# Patient Record
Sex: MALE | Race: WHITE
[De-identification: names, ages, dates, MRNs, and addresses within clinical notes are randomized per-mention and may not be internally consistent; named-entity substitution may affect disease eponyms.]

---

## 2018-03-09 ENCOUNTER — HOSPITAL ENCOUNTER (INPATIENT)
Dept: HOSPITAL 17 - NEPB | Age: 43
LOS: 1 days | Discharge: HOME | DRG: 101 | End: 2018-03-10
Attending: HOSPITALIST | Admitting: HOSPITALIST
Payer: COMMERCIAL

## 2018-03-09 VITALS
RESPIRATION RATE: 18 BRPM | DIASTOLIC BLOOD PRESSURE: 68 MMHG | SYSTOLIC BLOOD PRESSURE: 123 MMHG | TEMPERATURE: 97 F | OXYGEN SATURATION: 96 % | HEART RATE: 63 BPM

## 2018-03-09 VITALS
SYSTOLIC BLOOD PRESSURE: 120 MMHG | DIASTOLIC BLOOD PRESSURE: 78 MMHG | OXYGEN SATURATION: 95 % | HEART RATE: 62 BPM | TEMPERATURE: 97.5 F | RESPIRATION RATE: 18 BRPM

## 2018-03-09 VITALS
OXYGEN SATURATION: 96 % | TEMPERATURE: 97.7 F | RESPIRATION RATE: 20 BRPM | DIASTOLIC BLOOD PRESSURE: 87 MMHG | SYSTOLIC BLOOD PRESSURE: 119 MMHG | HEART RATE: 66 BPM

## 2018-03-09 VITALS
RESPIRATION RATE: 18 BRPM | DIASTOLIC BLOOD PRESSURE: 77 MMHG | SYSTOLIC BLOOD PRESSURE: 127 MMHG | TEMPERATURE: 97.6 F | OXYGEN SATURATION: 94 % | HEART RATE: 64 BPM

## 2018-03-09 VITALS
HEART RATE: 67 BPM | RESPIRATION RATE: 20 BRPM | TEMPERATURE: 98.2 F | DIASTOLIC BLOOD PRESSURE: 68 MMHG | SYSTOLIC BLOOD PRESSURE: 125 MMHG | OXYGEN SATURATION: 95 %

## 2018-03-09 DIAGNOSIS — Z94.81: ICD-10-CM

## 2018-03-09 DIAGNOSIS — J45.909: ICD-10-CM

## 2018-03-09 DIAGNOSIS — C92.00: ICD-10-CM

## 2018-03-09 DIAGNOSIS — K21.9: ICD-10-CM

## 2018-03-09 DIAGNOSIS — Z86.61: ICD-10-CM

## 2018-03-09 DIAGNOSIS — G40.909: Primary | ICD-10-CM

## 2018-03-09 DIAGNOSIS — E07.9: ICD-10-CM

## 2018-03-09 PROCEDURE — 85730 THROMBOPLASTIN TIME PARTIAL: CPT

## 2018-03-09 PROCEDURE — 85610 PROTHROMBIN TIME: CPT

## 2018-03-09 PROCEDURE — 96374 THER/PROPH/DIAG INJ IV PUSH: CPT

## 2018-03-09 PROCEDURE — 85025 COMPLETE CBC W/AUTO DIFF WBC: CPT

## 2018-03-09 PROCEDURE — 80053 COMPREHEN METABOLIC PANEL: CPT

## 2018-03-09 PROCEDURE — 70450 CT HEAD/BRAIN W/O DYE: CPT

## 2018-03-09 PROCEDURE — 99283 EMERGENCY DEPT VISIT LOW MDM: CPT

## 2018-03-09 PROCEDURE — 70553 MRI BRAIN STEM W/O & W/DYE: CPT

## 2018-03-09 PROCEDURE — 80307 DRUG TEST PRSMV CHEM ANLYZR: CPT

## 2018-03-09 PROCEDURE — 84484 ASSAY OF TROPONIN QUANT: CPT

## 2018-03-09 PROCEDURE — 71045 X-RAY EXAM CHEST 1 VIEW: CPT

## 2018-03-09 PROCEDURE — 83735 ASSAY OF MAGNESIUM: CPT

## 2018-03-09 PROCEDURE — 81001 URINALYSIS AUTO W/SCOPE: CPT

## 2018-03-09 PROCEDURE — 96375 TX/PRO/DX INJ NEW DRUG ADDON: CPT

## 2018-03-09 PROCEDURE — 96361 HYDRATE IV INFUSION ADD-ON: CPT

## 2018-03-09 PROCEDURE — 85652 RBC SED RATE AUTOMATED: CPT

## 2018-03-09 PROCEDURE — 70460 CT HEAD/BRAIN W/DYE: CPT

## 2018-03-09 PROCEDURE — 82550 ASSAY OF CK (CPK): CPT

## 2018-03-09 PROCEDURE — 80048 BASIC METABOLIC PNL TOTAL CA: CPT

## 2018-03-09 PROCEDURE — A9579 GAD-BASE MR CONTRAST NOS,1ML: HCPCS

## 2018-03-09 PROCEDURE — 95819 EEG AWAKE AND ASLEEP: CPT

## 2018-03-09 RX ADMIN — LACOSAMIDE SCH MG: 100 TABLET, FILM COATED ORAL at 11:11

## 2018-03-09 RX ADMIN — LACOSAMIDE SCH MG: 100 TABLET, FILM COATED ORAL at 21:37

## 2018-03-09 RX ADMIN — PANTOPRAZOLE SCH MG: 40 TABLET, DELAYED RELEASE ORAL at 10:07

## 2018-03-09 RX ADMIN — SERTRALINE HYDROCHLORIDE SCH MG: 50 TABLET, FILM COATED ORAL at 10:07

## 2018-03-09 NOTE — MB
cc:

Tk Myers MD

****

 

 

DATE OF CONSULT:

03/09/2018

 

HISTORY OF PRESENT ILLNESS:

The patient is a 42-year-old right-handed man with a history of 

thyroid disease, although he is not on medication.  He says that his 

doctors are watching this.  He has been otherwise healthy except that 

he was diagnosed with leukemia one year ago and had bone marrow 

transplant.  He follows with Dr. Morales as outpatient.   Since the 

bone marrow transplant he has had no complications; however, 

approximately one week ago he started developing some headaches 

primarily on the left side that occurred with certain episodes such as

he would have numbness that would start on one side of his body and 

spread to the other side of his body over a period of 30 minutes.  

Prior to the episodes, he would have a metallic taste in his mouth.  

This has happened three or four times over the last four weeks.  The 

last episode was yesterday.  He was unable to talk during this 

episode.  His wife was there.  He states that this is the first time 

he was trying to communicate during the episode.  The numbness usually

starts in one hand and spreads throughout the other side of the body. 

After the episodes he can experience a little bit of confusion and 

otherwise the numbness completely subsides between episodes.

 

Per the patient's wife, he did have a seizure disorder with the prior 

history of leukemia, was taken off seizure medications after the bone 

marrow transplant and has been having some neurological symptoms for 

the last month with periods of decreased cognition and numbness.

 

PAST MEDICAL HISTORY:

1.  History of viral meningitis.

2.  Gangrenous cholecystitis.

3.  Asthma.

4.  Leukemia with bone marrow transplant.

 

REVIEW OF SYSTEMS:

He denies hypertension, diabetes, hypercholesterolemia, myocardial 

infarction, bypass and angioplasty, atrial fibrillation, Coumadin.  He

is not on any other blood thinners.  He denies renal disease, hepatic 

disease, pulmonary disease, lupus, ulcer or stroke in the past.  He 

has no major medical problems.

 

SOCIAL HISTORY:

Nonsmoker, nondrinker.  Does not do illicit drugs.  Lives with his 

wife.

 

FAMILY HISTORY:

Positive for cancer, negative for seizure or stroke.

 

HOSPITALIZATIONS:

One year ago he was admitted to [facility].  We can check the 

record over there.

 

MEDICATIONS AT HOME:

1.  Valacyclovir 500 mg daily.

2.  Sertraline 25 mg daily.

3.  Pantoprazole 40 mg daily.

 

MEDICATIONS IN THE HOSPITAL:

1.  Continued on the Protonix.

2.  Zoloft.

3.  He was given some Toradol.

4.  Benadryl.

5.  Ativan.

 

PHYSICAL EXAMINATION:

VITAL SIGNS:  Afebrile, pulse 62, respiratory rate 18, blood pressure 

120/78, pulse 95% on room air.

CARDIOVASCULAR:  Heart - regular rate and rhythm.  I do not detect a 

murmur or carotid bruit.

NEUROLOGICAL EXAMINATION:  Visual fields are full.  Extraocular 

movements are intact, without nystagmus.  Tongue is midline.  Face is 

symmetric.  There is no drift.  Strength is 5/5 in the upper and lower

extremities bilaterally.  DTRs are 2+ bilaterally.  Toes were 

downgoing bilaterally.  There is no ankle clonus.   Tone is normal 

throughout.  Pinprick and vibratory sense is intact throughout.   He 

is not ataxic on finger-to-nose or heel-to-shin.  He is not aphasic at

this time.

 

LABORATORY DATA:

CBC is normal except platelet counts are low at 141.

Coags - PT and PTT are a little bit low.

Urine was negative.

Chemistry was negative.

Troponin was negative.

Urine toxicology is positive for some barbiturates.

 

IMAGING STUDIES:

No focal areas of abnormal enhancement seen, some pooling of contrast 

in the sulcal vessels, symmetric bilaterally, without focal enhancing 

masses.  Recommend MRI of the brain.

 

The chest x-ray is read as negative.

 

IMPRESSION:

Possible sensory seizures versus complex partial seizures.

 

PLAN:

We will find out which medication he was taking in the past and likely

put him back on this medication.  I will order an MRI of the brain and

EEG and some additional blood work with the recent headaches.  We will

put him on 100 mg of Vimpat twice per day.  He can take this for three

days and then increase it to 200 mg twice per day as he has been on 

that before and it has worked well for him.   He could probably go 

home later today or tomorrow as long as the MRI of the brain is 

negative and the EEG is negative and his lab work is okay.

 

Dictated by NATIVIDAD Hunt

 

 

__________________________________

MD OLE York/CORTNEY

D: 03/09/2018, 08:59 AM

T: 03/09/2018, 09:56 AM

Visit #: [id number]

Job #: [id number]

## 2018-03-09 NOTE — HHI.HP
HPI


Service


[facility]


Primary Care Physician


Deepak Recinos MD


Admission Diagnosis


Transient neurological deficits, hx of seizures


Chief Complaint:  


Numbness of extremities, speech deficit


Travel History


International Travel<30 Days:  No


Contact w/Intl Traveler <30 Da:  No


Traveled to Known Affected Are:  No


History of Present Illness


Mr. Barajas is a pleasant 41 y/o man with hx of acute myelogenous leukemia s/p 

BM transplant in 2017, hx of viral meningitis in 2016 and hx of seizure 

disorder. He follows with Dr. Morales as an outpatient for the hx of AML. Pt 

has reportedly done well since the bone marrow transplant. Approximately one 

week ago he started developing some headaches primarily on the left side and 

reports that he has had some episodes of a numbness sensation that would start 

on one side of his body, typically in his hand, and spread to the other side of 

his body over a period of 30 minutes. Prior to the episodes, he has noted a 

metallic taste in his mouth. This has happened three or four times over the 

week or so. The last episode occurred yesterday and was unable to talk during 

this episode. He states that this is the first time he had tried to communicate 

during one the episodes so he doesn't know if he speech was impaired during the 

prior ones.  His wife was present for this episode and this prompted his 

evaluation in the ED. After the episodes he can experience a little bit of 

confusion and otherwise the numbness completely subsides between episodes. The 

pt is diagnosed with a previous seizure disorder prior to his AML diagnosis but 

states that after his bone marrow transplant he was taken off seizure 

medications around 1 year ago. He had previously been on Vimpat which he 

reports worked well for him. He denies any bowel or bladder incontinence during 

these episodes. In the ER in [place] he was given a dose of IV Ativan and then 

later IV Reglan. He was also given IV Toradol, Decadron and Benadryl.





Review of Systems


Constitutional:  DENIES: Fever, Chills, Night Sweats


Eyes:  DENIES: Vision loss


Ears, nose, mouth, throat:  DENIES: Hearing loss, Vertigo


Respiratory:  DENIES: Cough, Shortness of breath


Cardiovascular:  DENIES: Chest pain, Palpitations, Lower Extremity Edema


Gastrointestinal:  DENIES: Diarrhea, Nausea, Vomiting


Musculoskeletal:  DENIES: Back pain, Neck pain


Integumentary:  DENIES: Rash


Neurologic:  COMPLAINS OF: Headache, Paresthesias, Speech Problems





Past Family Social History


Past Medical History


Acute myelogenous leukemia s/p BM transplant in 2017


Hx of viral meningitis in 2016


Seizure disorder


Hx of gangrenous cholecystitis


GERD


Asthma


Chronic headaches


Past Surgical History


Ommaya reservoir placement and removal


[id number] placement and removal


Leon catheter placement


Left orchiectomy


Cholecystectomy 


Multiple ERCPs with stent placement/replacement for bile leak


Hip surgery


Reported Medications


Zoloft (Sertraline HCl) 25 Mg Tab 25 Mg PO DAILY


Protonix (Pantoprazole Sodium) 40 Mg Tab 40 Mg PO DAILY


Valacyclovir (Valacyclovir HCl) 500 Mg Tab 500 Mg PO DAILY


Allergies:  


Coded Allergies:  


     hydromorphone (Verified  Allergy, Intermediate, personality changes , 3/8/

18)


     acyclovir (Verified  Allergy, Unknown, PARALYSIS, 17)


     phenytoin (Verified  Allergy, Unknown, RASH, 17)


Family History


Noncontributory


Social History


Denies any alcohol, tobacco or illicit drug use





Physical Exam


Vital Signs





Vital Signs








  Date Time  Temp Pulse Resp B/P (MAP) Pulse Ox O2 Delivery O2 Flow Rate FiO2


 


3/9/18 08:00 97.5 62 18 120/78 (92) 95   


 


3/9/18 07:10      Room Air  


 


3/9/18 06:24      Room Air  


 


3/9/18 05:55 97.7 66 20 119/87 (98) 96   








Physical Exam


GENERAL: This is a well-nourished, well-developed patient, in no apparent 

distress.


SKIN: No rashes, ecchymoses or lesions. Cool and dry.


HEAD: Atraumatic. Normocephalic. No temporal or scalp tenderness. No injection 

or drainage. Airway patent.


NECK: Trachea midline. No JVD or lymphadenopathy. Supple, nontender, no 

meningeal signs.


CARDIO: Regular. 


RESP: CTA bilaterally. No wheezes, rales, or rhonchi.  


ABD: +BS, soft, non-tender, nondistended. 


EXT: Extremities without clubbing, cyanosis, or edema. 


NEURO: Awake and alert. Cranial nerves II through XII intact.  Motor and 

sensory grossly within normal limits. 


 Five out of 5 muscle strength in all muscle groups.  Normal speech.


Imaging


CT Brain W/O IV Contrast (3/8/18)


- Symmetric decreased attenuation in the supratentorial frontal and parietal 

white matter. No evidence of bleed or mass effect. This suggests possible 

ischemic process.





CT Brain with IV Contrast (3/8/18)


- No focal areas of abnormal enhancement seen. There is some pooling of 

contrast in the sulcal vessels, symmetric bilaterally without focal enhancing 

masses.





Caprini VTE Risk Assessment


Caprini VTE Risk Assessment:  Mod/High Risk (score >= 2)


Caprini Risk Assessment Model











 Point Value = 1          Point Value = 2  Point Value = 3  Point Value = 5


 


Age 41-60


Minor surgery


BMI > 25 kg/m2


Swollen legs


Varicose veins


Pregnancy or postpartum


History of unexplained or recurrent


   spontaneous 


Oral contraceptives or hormone


   replacement


Sepsis (< 1 month)


Serious lung disease, including


   pneumonia (< 1 month)


Abnormal pulmonary function


Acute myocardial infarction


Congestive heart failure (< 1 month)


History of inflammatory bowel disease


Medical patient at bed rest Age 61-74


Arthroscopic surgery


Major open surgery (> 45 min)


Laparoscopic surgery (> 45 min)


Malignancy


Confined to bed (> 72 hours)


Immobilizing plaster cast


Central venous access Age >= 75


History of VTE


Family history of VTE


Factor V Leiden


Prothrombin [id number]


Lupus anticoagulant


Anticardiolipin antibodies


Elevated serum homocysteine


Heparin-induced thrombocytopenia


Other congenital or acquired


   thrombophilia Stroke (< 1 month)


Elective arthroplasty


Hip, pelvis, or leg fracture


Acute spinal cord injury (< 1 month)








Prophylaxis Regimen











   Total Risk


Factor Score Risk Level Prophylaxis Regimen


 


0-1      Low Early ambulation


 


2 Moderate Order ONE of the following:


*Sequential Compression Device (SCD)


*Heparin 5000 units SQ BID


 


3-4 Higher Order ONE of the following medications:


*Heparin 5000 units SQ TID


*Enoxaparin/Lovenox 40 mg SQ daily (WT < 150 kg, CrCl > 30 mL/min)


*Enoxaparin/Lovenox 30 mg SQ daily (WT < 150 kg, CrCl > 10-29 mL/min)


*Enoxaparin/Lovenox 30 mg SQ BID (WT < 150 kg, CrCl > 30 mL/min)


AND/OR


*Sequential Compression Device (SCD)


 


5 or more Highest Order ONE of the following medications:


*Heparin 5000 units SQ TID (Preferred with Epidurals)


*Enoxaparin/Lovenox 40 mg SQ daily (WT < 150 kg, CrCl > 30 mL/min)


*Enoxaparin/Lovenox 30 mg SQ daily (WT < 150 kg, CrCl > 10-29 mL/min)


*Enoxaparin/Lovenox 30 mg SQ BID (WT < 150 kg, CrCl > 30 mL/min)


AND


*Sequential Compression Device (SCD)











Assessment and Plan


Problem List:  


(1) Seizure disorder


ICD Codes:  G40.909 - Epilepsy, unspecified, not intractable, without status 

epilepticus


Status:  Acute


Plan:  


- Pt is a 41 y/o man with hx of acute myelogenous leukemia s/p BM transplant in 

2017, hx of viral meningitis in 2016 and hx of seizure disorder. 


- Approximately one week ago he started developing some headaches primarily on 

the left side and reports that he has had some episodes of a 


 numbness sensation that would start on one side of his body, typically in his 

hand, and spread to the other side of his body over a period of 30 minutes. 


 Prior to the episodes, he has noted a metallic taste in his mouth. This has 

happened three or four times over the week or so. The last episode occurred


 yesterday and was unable to talk during this episode and this prompted his 

evaluation in the ED. 


- After the episodes he can experience a little bit of confusion and otherwise 

the numbness completely subsides between episodes. 


- The pt is diagnosed with a previous seizure disorder prior to his AML 

diagnosis but states that after his bone marrow transplant he was taken off 

seizure 


 medications around 1 year ago. 


- He had previously been on Vimpat which he reports worked well for him. 


- In the ER in [place] he was given a dose of IV Ativan and then later IV 

Reglan. He was also given IV Toradol, Decadron and Benadryl and then transferred


 to [facility] for neurology evaluation. 


- MRI Brain ordered


- EEG ordered


- Neurology has started Vimpat 100mg po BID x 3 days, then increase to 200mg po 

BID. 


- Monitor clinical status closely


- Ativan PRN for any seizure activity


- Seizure precautions


- Supportive care





- DVT prophylaxis with SCDs





(2) AML (acute myeloblastic leukemia)


ICD Codes:  C92.00 - Acute myeloblastic leukemia, not having achieved remission


Status:  Resolved


Plan:  


- Pt follows with Dr. Morales as an outpatient for the hx of AML. 


- Pt has reportedly done well since the bone marrow transplant.





Assessment and Plan


Patient examined.


Assessment and plan formulated with Mela Hunter PA-C.


I agree with the above


concern for recurrence of his seizure d/o


mri reviewed with pt and Dr Myers will review that and his EEG


before deciding on dc. AED resumed. Pt w/out sz activity today.





Physician Certification


2 Midnight Certification Type:  Admission for Inpatient Services


Order for Inpatient Services


The services are ordered in accordance with Medicare regulations or non-

Medicare payer requirements, as applicable.  In the case of services not 

specified as inpatient-only, they are appropriately provided as inpatient 

services in accordance with the 2-midnight benchmark.


Estimated LOS (days):  2


2 days is the estimated time the patient will need to remain in the hospital, 

assuming treatment plan goals are met and no additional complications.


Post-Hospital Plan:  Home





Problem Qualifiers





(1) AML (acute myeloblastic leukemia):  


Qualified Codes:  C92.01 - Acute myeloblastic leukemia, in remission








Mela Hunter Mar 9, 2018 08:26


Issa aGspar MD Mar 9, 2018 17:22

## 2018-03-09 NOTE — RADRPT
EXAM DATE/TIME:  03/09/2018 12:54 

 

[facility] COMPARISON:     

CT BRAIN W/O CONTRAST, March 08, 2018, 20:38.  CT BRAIN W CONTRAST, March 08, 2018, 21:12.

       

 

 

INDICATIONS :     

Cephalgia.

                     

 

CONTRAST:     

22 cc Omniscan (gadodiamide) IV

                     

 

MEDICAL HISTORY :     

Leukemia.     

 

SURGICAL HISTORY :     

Cholecystectomy.     Omaya port placed and removed.

 

ENCOUNTER:     

Initial

 

ACUITY:     

2 day

 

PAIN SCORE:     

4/10

 

LOCATION:         

head

 

TECHNIQUE:     

Multiplanar, multisequence MRI of the brain was performed both prior to and following the administrat
ion of paramagnetic contrast.

 

FINDINGS:     

 

CEREBRUM:     

The ventricles are normal for age.  No evidence of midline shift, mass lesion, hemorrhage or acute in
farction.  No extraaxial fluid collections are seen.  The pituitary gland and suprasellar cistern are
 normal in configuration.

 

WHITE MATTER:     

MRI confirms the presence of white matter edema at the junction of the centrum semiovale and corona r
adiata, right worse in left. No diffusion restriction to suggest an acute or subacute ischemic event,
 however. No midline shift.

 

POSTERIOR FOSSA:     

The cerebellum and brainstem are intact.  The 4th ventricle is midline. The cerebellopontine angle is
 unremarkable.  The cerebellar tonsils are normal in position.

 

DIFFUSION IMAGING:     

No focal areas of restricted diffusion are seen.  No evidence of acute infarction.

 

EXTRACRANIAL:     

The visualized portions of the orbits and paranasal sinuses are unremarkable. Suggestion of an old ronaldo
re hole in the right perivertex distribution. Subjacent to this possible defect with susceptibility a
rtifact possibly representing the tract of an old pressure monitoring bolt.

 

POST-CONTRAST:     

No abnormal areas of parenchymal or dural enhancement.  No evidence of blood-brain barrier breakdown.


 

CONCLUSION:     

1. Bilateral white matter edema the junction of the centrum semiovale and corona radiata, right worse
 than left.

2. No diffusion restriction to suggest acute or subacute ischemic event. Findings could represent an 
old ischemic event or metabolic process, however.

3. Suggestion of an old bore hole in the right anterior perivertex distribution with subjacent suscep
tibility artifact in the regional brain parenchyma. Is there a history of a previous pressure monitor
ing bolt

 

 

 

 Jonh Bentley MD on March 09, 2018 at 14:56           

Board Certified Radiologist.

 This report was verified electronically.

## 2018-03-10 VITALS
TEMPERATURE: 98.4 F | OXYGEN SATURATION: 97 % | DIASTOLIC BLOOD PRESSURE: 63 MMHG | SYSTOLIC BLOOD PRESSURE: 118 MMHG | HEART RATE: 96 BPM | RESPIRATION RATE: 20 BRPM

## 2018-03-10 VITALS
HEART RATE: 57 BPM | SYSTOLIC BLOOD PRESSURE: 127 MMHG | DIASTOLIC BLOOD PRESSURE: 79 MMHG | RESPIRATION RATE: 18 BRPM | OXYGEN SATURATION: 96 % | TEMPERATURE: 98.2 F

## 2018-03-10 VITALS
DIASTOLIC BLOOD PRESSURE: 65 MMHG | RESPIRATION RATE: 20 BRPM | TEMPERATURE: 97.7 F | HEART RATE: 54 BPM | OXYGEN SATURATION: 96 % | SYSTOLIC BLOOD PRESSURE: 113 MMHG

## 2018-03-10 VITALS
OXYGEN SATURATION: 98 % | RESPIRATION RATE: 18 BRPM | SYSTOLIC BLOOD PRESSURE: 120 MMHG | DIASTOLIC BLOOD PRESSURE: 76 MMHG | HEART RATE: 61 BPM

## 2018-03-10 VITALS
OXYGEN SATURATION: 96 % | RESPIRATION RATE: 18 BRPM | TEMPERATURE: 97.7 F | HEART RATE: 54 BPM | DIASTOLIC BLOOD PRESSURE: 75 MMHG | SYSTOLIC BLOOD PRESSURE: 123 MMHG

## 2018-03-10 VITALS — HEART RATE: 50 BPM

## 2018-03-10 LAB
BASOPHILS # BLD AUTO: 0 TH/MM3 (ref 0–0.2)
BASOPHILS NFR BLD: 0.5 % (ref 0–2)
BUN SERPL-MCNC: 21 MG/DL (ref 7–18)
CALCIUM SERPL-MCNC: 8.2 MG/DL (ref 8.5–10.1)
CHLORIDE SERPL-SCNC: 108 MEQ/L (ref 98–107)
CREAT SERPL-MCNC: 1.02 MG/DL (ref 0.6–1.3)
EOSINOPHIL # BLD: 0 TH/MM3 (ref 0–0.4)
EOSINOPHIL NFR BLD: 0.3 % (ref 0–4)
ERYTHROCYTE [DISTWIDTH] IN BLOOD BY AUTOMATED COUNT: 13.1 % (ref 11.6–17.2)
GFR SERPLBLD BASED ON 1.73 SQ M-ARVRAT: 81 ML/MIN (ref 89–?)
GLUCOSE SERPL-MCNC: 93 MG/DL (ref 74–106)
HCO3 BLD-SCNC: 27.6 MEQ/L (ref 21–32)
HCT VFR BLD CALC: 34.9 % (ref 39–51)
HGB BLD-MCNC: 12.5 GM/DL (ref 13–17)
LYMPHOCYTES # BLD AUTO: 1.6 TH/MM3 (ref 1–4.8)
LYMPHOCYTES NFR BLD AUTO: 22.4 % (ref 9–44)
MAGNESIUM SERPL-MCNC: 2.1 MG/DL (ref 1.5–2.5)
MCH RBC QN AUTO: 34.2 PG (ref 27–34)
MCHC RBC AUTO-ENTMCNC: 35.7 % (ref 32–36)
MCV RBC AUTO: 95.7 FL (ref 80–100)
MONOCYTE #: 0.5 TH/MM3 (ref 0–0.9)
MONOCYTES NFR BLD: 6.4 % (ref 0–8)
NEUTROPHILS # BLD AUTO: 4.9 TH/MM3 (ref 1.8–7.7)
NEUTROPHILS NFR BLD AUTO: 70.4 % (ref 16–70)
PLATELET # BLD: 125 TH/MM3 (ref 150–450)
PMV BLD AUTO: 9.2 FL (ref 7–11)
RBC # BLD AUTO: 3.65 MIL/MM3 (ref 4.5–5.9)
SODIUM SERPL-SCNC: 141 MEQ/L (ref 136–145)
WBC # BLD AUTO: 7 TH/MM3 (ref 4–11)

## 2018-03-10 RX ADMIN — LACOSAMIDE SCH MG: 100 TABLET, FILM COATED ORAL at 09:32

## 2018-03-10 RX ADMIN — PANTOPRAZOLE SCH MG: 40 TABLET, DELAYED RELEASE ORAL at 09:31

## 2018-03-10 RX ADMIN — SERTRALINE HYDROCHLORIDE SCH MG: 50 TABLET, FILM COATED ORAL at 09:32

## 2018-03-10 NOTE — HHI.PR
Subjective


Remarks


no sz activity . would like to go home.





Objective


Vitals


heart reg


lung cta


abd s/nt


ext no edema


Vital Signs








  Date Time  Temp Pulse Resp B/P (MAP) Pulse Ox O2 Delivery O2 Flow Rate FiO2


 


3/10/18 09:09  50      


 


3/10/18 08:05 97.7 54 18 123/75 (91) 96   


 


3/10/18 04:00  54      


 


3/10/18 04:00 97.7 54 20 113/65 (81) 96   


 


3/10/18 00:00 98.4 96 20 118/63 (81) 97   


 


3/10/18 00:00  61      


 


3/9/18 21:28      Room Air  


 


3/9/18 20:00  62      


 


3/9/18 20:00 98.2 67 20 125/68 (87) 95   


 


3/9/18 16:00 97.6 64 18 127/77 (94) 94   


 


3/9/18 15:42      Room Air  


 


3/9/18 12:00 97.0 63 18 123/68 (86) 96   








Result Diagram:  


3/10/18 0554                                                                   

             3/10/18 0554





Imaging


CT Brain W/O IV Contrast (3/8/18)


- Symmetric decreased attenuation in the supratentorial frontal and parietal 

white matter. No evidence of bleed or mass effect. This suggests possible 

ischemic process.





CT Brain with IV Contrast (3/8/18)


- No focal areas of abnormal enhancement seen. There is some pooling of 

contrast in the sulcal vessels, symmetric bilaterally without focal enhancing 

masses.





A/P


Problem List:  


(1) Seizure disorder


ICD Codes:  G40.909 - Epilepsy, unspecified, not intractable, without status 

epilepticus


Status:  Acute


Plan:  


- Pt is a 41 y/o man with hx of acute myelogenous leukemia s/p BM transplant in 

2017, hx of viral meningitis in 2016 and hx of seizure disorder. 


- Approximately one week ago he started developing some headaches primarily on 

the left side and reports that he has had some episodes of a 


 numbness sensation that would start on one side of his body, typically in his 

hand, and spread to the other side of his body over a period of 30 minutes. 


 Prior to the episodes, he has noted a metallic taste in his mouth. This has 

happened three or four times over the week or so. The last episode occurred


 yesterday and was unable to talk during this episode and this prompted his 

evaluation in the ED. 


- After the episodes he can experience a little bit of confusion and otherwise 

the numbness completely subsides between episodes. 


- The pt is diagnosed with a previous seizure disorder prior to his AML 

diagnosis but states that after his bone marrow transplant he was taken off 

seizure 


 medications around 1 year ago. 


- He had previously been on Vimpat which he reports worked well for him. 


- In the ER in [place] he was given a dose of IV Ativan and then later IV 

Reglan. He was also given IV Toradol, Decadron and Benadryl and then transferred


 to [facility] for neurology evaluation. 


- MRI Brain noted some "edema changes..unclear if acute or chronic per radiology

"....Pt notes hx swelling around the time of his AML and bone marrow 


transplant...defer mri findings and eeg to neurology





- Neurology has started Vimpat 100mg po BID x 3 days, then increase to 200mg po 

BID. 


- DC HOME IF OK WITH NEUROLOGY....MRI AND EEG RESULT PER NEUROLOGY.





(2) AML (acute myeloblastic leukemia)


ICD Codes:  C92.00 - Acute myeloblastic leukemia, not having achieved remission


Status:  Resolved


Plan:  


- Pt follows with Dr. Morales as an outpatient for the hx of AML. 


- Pt has reportedly done well since the bone marrow transplant.








Problem Qualifiers





(1) AML (acute myeloblastic leukemia):  


Qualified Codes:  C92.01 - Acute myeloblastic leukemia, in remission








Issa Gaspar MD Mar 10, 2018 11:01

## 2018-03-10 NOTE — HHI.DCPOC
Discharge Care Plan


Diagnosis:  


(1) Seizure disorder


Goals to Promote Your Health


* To prevent worsening of your condition and complications


* To maintain your health at the optimal level


Directions to Meet Your Goals


*** Take your medications as prescribed


*** Follow your dietary instruction


*** Follow activity as directed








*** Keep your appointments as scheduled


*** Take your immunizations and boosters as scheduled


*** If your symptoms worsen call your PCP, if no PCP go to Urgent Care Center 

or Emergency Room***


*** Smoking is Dangerous to Your Health. Avoid second hand smoke***


***Call the 24-hour hour crisis hotline for domestic abuse at 1-722.385.1232***











Issa Gaspar MD Mar 10, 2018 11:04

## 2018-03-10 NOTE — HHI.PR
Subjective


Remarks


sr





Objective





Vital Signs








  Date Time  Temp Pulse Resp B/P (MAP) Pulse Ox O2 Delivery O2 Flow Rate FiO2


 


3/10/18 16:07 98.2 57 18 127/79 (95) 96   


 


3/10/18 12:39  61 18 120/76 (91) 98   


 


3/10/18 09:09  50      


 


3/10/18 08:05 97.7 54 18 123/75 (91) 96   


 


3/10/18 04:00  54      


 


3/10/18 04:00 97.7 54 20 113/65 (81) 96   


 


3/10/18 00:00 98.4 96 20 118/63 (81) 97   


 


3/10/18 00:00  61      


 


3/9/18 21:28      Room Air  


 


3/9/18 20:00  62      


 


3/9/18 20:00 98.2 67 20 125/68 (87) 95   














I/O      


 


 3/9/18 3/9/18 3/9/18 3/10/18 3/10/18 3/10/18





 07:00 15:00 23:00 07:00 15:00 23:00


 


Intake Total   480 ml   


 


Balance   480 ml   


 


      


 


Intake Oral   480 ml   


 


# Voids   3 3  


 


# Bowel Movements   0 0  








Result Diagram:  


3/10/18 0554                                                                   

             3/10/18 0554





Objective Remarks


awake alert nl gait vff ox3





Assessment and Plan


Assessment and Plan


mri





i reviewed and c/t 2017 may film really essentially the same 


the left change in wm may be alittle larger vs just diff slice location





no more sz on vimapt





had xrt braina nd omaya revevior chem in brain acc to family





ok dc on vimpat 


i gave script





fu office


not to drive etc 6 mo i dw him and he agrees











Tk Myers MD Mar 10, 2018 16:13

## 2018-03-12 NOTE — MG
cc:

Tk Myers MD

****

 

 

EEG NUMBER:



 

NOTE:

Hyperventilation performed.

Taken off seizure meds after transplant.  Had been on Vimpat.  History

of seizures.

History of depression, leukemia.

 

MEDICATIONS:

1.  Zoloft.

2.  Protonix.

 

FINDINGS:

Some diffuse 4 Hz to 5 Hz slowing is noted on a background of normal 

alpha and beta rhythms.  He fell asleep and a lot of snoring was seen.

 Sleep spindles are seen, symmetric and synchronous.  A vertex sharp 

wave is noted along with a K-complex which is vertex sharp wave and 

sleep spindles together, all synchronous and symmetric.   Some 5 Hz 

sharply contoured waves are seen over the midline head region at epoch

87 especially on the transverse montage presenting as a normal sleep 

variant.

 

Photic stimulation is performed without significant posterior driving.

 

 

Hyperventilation was not performed.

 

IMPRESSION:

A normal Stage II sleep EEG.   No evidence for a focal or diffuse 

abnormality.

 

 

__________________________________

MD OLE [place]/CORTNEY

D: 03/09/2018, 10:00 AM

T: 03/09/2018, 10:40 AM

Visit #: [id number]

Job #: [id number]